# Patient Record
Sex: MALE | Race: WHITE
[De-identification: names, ages, dates, MRNs, and addresses within clinical notes are randomized per-mention and may not be internally consistent; named-entity substitution may affect disease eponyms.]

---

## 2020-08-19 ENCOUNTER — HOSPITAL ENCOUNTER (OUTPATIENT)
Dept: HOSPITAL 76 - EMS | Age: 65
Discharge: ANOTHER HOSPITAL | End: 2020-08-19
Attending: SURGERY
Payer: COMMERCIAL

## 2020-08-19 ENCOUNTER — HOSPITAL ENCOUNTER (EMERGENCY)
Dept: HOSPITAL 76 - ED | Age: 65
Discharge: HOME | End: 2020-08-19
Payer: COMMERCIAL

## 2020-08-19 VITALS — DIASTOLIC BLOOD PRESSURE: 103 MMHG | SYSTOLIC BLOOD PRESSURE: 139 MMHG

## 2020-08-19 DIAGNOSIS — R51: Primary | ICD-10-CM

## 2020-08-19 DIAGNOSIS — R20.0: ICD-10-CM

## 2020-08-19 DIAGNOSIS — J32.2: ICD-10-CM

## 2020-08-19 DIAGNOSIS — Z87.891: ICD-10-CM

## 2020-08-19 LAB
ALBUMIN DIAFP-MCNC: 4.4 G/DL (ref 3.2–5.5)
ALBUMIN/GLOB SERPL: 1.6 {RATIO} (ref 1–2.2)
ALP SERPL-CCNC: 59 IU/L (ref 42–121)
ALT SERPL W P-5'-P-CCNC: 15 IU/L (ref 10–60)
ANION GAP SERPL CALCULATED.4IONS-SCNC: 12 MMOL/L (ref 6–13)
AST SERPL W P-5'-P-CCNC: 16 IU/L (ref 10–42)
BASOPHILS NFR BLD AUTO: 0 10^3/UL (ref 0–0.1)
BASOPHILS NFR BLD AUTO: 0.5 %
BILIRUB BLD-MCNC: 0.7 MG/DL (ref 0.2–1)
BUN SERPL-MCNC: 13 MG/DL (ref 6–20)
CALCIUM UR-MCNC: 9.5 MG/DL (ref 8.5–10.3)
CHLORIDE SERPL-SCNC: 101 MMOL/L (ref 101–111)
CO2 SERPL-SCNC: 26 MMOL/L (ref 21–32)
CREAT SERPLBLD-SCNC: 1 MG/DL (ref 0.6–1.2)
EOSINOPHIL # BLD AUTO: 0.2 10^3/UL (ref 0–0.7)
EOSINOPHIL NFR BLD AUTO: 2.3 %
ERYTHROCYTE [DISTWIDTH] IN BLOOD BY AUTOMATED COUNT: 12.4 % (ref 12–15)
GLOBULIN SER-MCNC: 2.8 G/DL (ref 2.1–4.2)
GLUCOSE SERPL-MCNC: 98 MG/DL (ref 70–100)
HGB UR QL STRIP: 14.6 G/DL (ref 14–18)
LIPASE SERPL-CCNC: 36 U/L (ref 22–51)
LYMPHOCYTES # SPEC AUTO: 2.2 10^3/UL (ref 1.5–3.5)
LYMPHOCYTES NFR BLD AUTO: 27 %
MCH RBC QN AUTO: 31.5 PG (ref 27–31)
MCHC RBC AUTO-ENTMCNC: 34.4 G/DL (ref 32–36)
MCV RBC AUTO: 91.6 FL (ref 80–94)
MONOCYTES # BLD AUTO: 0.5 10^3/UL (ref 0–1)
MONOCYTES NFR BLD AUTO: 5.9 %
NEUTROPHILS # BLD AUTO: 5.2 10^3/UL (ref 1.5–6.6)
NEUTROPHILS # SNV AUTO: 8.2 X10^3/UL (ref 4.8–10.8)
NEUTROPHILS NFR BLD AUTO: 63.9 %
PDW BLD AUTO: 10.9 FL (ref 7.4–11.4)
PLATELET # BLD: 243 10^3/UL (ref 130–450)
PROT SPEC-MCNC: 7.2 G/DL (ref 6.7–8.2)
RBC MAR: 4.63 10^6/UL (ref 4.7–6.1)
SODIUM SERPLBLD-SCNC: 139 MMOL/L (ref 135–145)

## 2020-08-19 PROCEDURE — 36415 COLL VENOUS BLD VENIPUNCTURE: CPT

## 2020-08-19 PROCEDURE — 83690 ASSAY OF LIPASE: CPT

## 2020-08-19 PROCEDURE — 99284 EMERGENCY DEPT VISIT MOD MDM: CPT

## 2020-08-19 PROCEDURE — 70450 CT HEAD/BRAIN W/O DYE: CPT

## 2020-08-19 PROCEDURE — 80053 COMPREHEN METABOLIC PANEL: CPT

## 2020-08-19 PROCEDURE — 86140 C-REACTIVE PROTEIN: CPT

## 2020-08-19 PROCEDURE — 85025 COMPLETE CBC W/AUTO DIFF WBC: CPT

## 2020-08-19 PROCEDURE — 85651 RBC SED RATE NONAUTOMATED: CPT

## 2020-08-19 NOTE — CT REPORT
PROCEDURE:  HEAD WO

 

INDICATIONS:  daily headaches for 2 weeks

 

TECHNIQUE:  

Noncontrast 4.5 mm thick angled axial sections acquired from the foramen magnum to the vertex.  For r
adiation dose reduction, the following was used:  automated exposure control, adjustment of mA and/or
 kV according to patient size.

 

COMPARISON:  None.

 

FINDINGS:  

Image quality:  Excellent.  

 

CSF spaces:  Basal cisterns are patent.  No extra-axial fluid collections.  Ventricles are normal in 
size and shape.  

 

Brain:  No midline shift.  No intracranial masses or hemorrhage.  Gray-white matter interface is norm
al.  

 

Skull and face: No acute abnormality is seen in calvarium and bilateral facial bones. Prior fixation 
of right lateral orbital wall is seen.

 

Sinuses: Mild mucosal thickening in bilateral ethmoid sinuses are seen.

 

IMPRESSION:  

1. No CT evidence of acute intracranial pathology.

2. Postsurgical changes in lateral right orbital wall. Mild bilateral ethmoid sinusitis.

 

Reviewed by: Wilberto Rodriguez MD on 8/19/2020 6:31 PM PDT

Approved by: Wilberto Rodriguez MD on 8/19/2020 6:31 PM PDT

 

 

Station ID:  [id number]

## 2020-08-19 NOTE — ED PHYSICIAN DOCUMENTATION
History of Present Illness





- Stated complaint


Stated Complaint: HA





- Chief complaint


Chief Complaint: Neuro





- History obtained from


History obtained from: Patient





- Additonal information


Additional information: 


65-year-old male presents to the emergency department for evaluation of 2 weeks 

left temporal headache.  He reports that the headache has been constant in 

nature but over the last 2 days the skin in the area overlying the temple has 

become numb.  He has had no fevers no cough or dyspnea.  He denies slurred 

speech focal arm or leg weakness.  He has had no vision changes or diplopia.





Patient denies any history of high blood pressure or diabetes.  He takes no 

prescribed medications.  He works as a .  He denies tobacco use. 

He is a social drinker and occasionally eats cannabis edibles.








Review of Systems


Constitutional: denies: Fever, Chills


Eyes: denies: Loss of vision, Decreased vision, Photophobia, Irritation


Ears: denies: Loss of hearing, Ear pain


Nose: denies: Rhinorrhea / runny nose


Throat: denies: Dental pain / toothache


Cardiac: denies: Chest pain / pressure, Palpitations


Respiratory: denies: Dyspnea, Hemoptysis, Wheezing


GI: denies: Abdominal Pain, Abdominal Swelling, Nausea, Vomiting, Diarrhea


: denies: Dysuria, Frequency, Hesitancy


Skin: denies: Rash, Lesions, Abrasion (s)


Musculoskeletal: denies: Neck pain, Back pain


Neurologic: reports: Headache.  denies: Generalized weakness, Focal weakness, 

Numbness, Difficulty speaking, Near syncope, Syncope, Seizure, Confused, Altered

mental status, Head injury, LOC





PD PAST MEDICAL HISTORY





- Past Medical History


Past Medical History: Yes


Cardiovascular: None


Respiratory: Asthma


Neuro: Headaches


Endocrine/Autoimmune: None


GI: None


: None


HEENT: Chronic sinusitis


Psych: None


Musculoskeletal: Chronic back pain


Derm: None





- Past Surgical History


Past Surgical History: No





- Present Medications


Home Medications: 


                                Ambulatory Orders











 Medication  Instructions  Recorded  Confirmed


 


Ibuprofen [Motrin] 600 mg PO Q6H PRN #30 tab 08/19/20 














- Allergies


Allergies/Adverse Reactions: 


                                    Allergies











Allergy/AdvReac Type Severity Reaction Status Date / Time


 


No Known Drug Allergies Allergy   Verified 08/19/20 16:00














- Social History


Does the pt smoke?: Yes


Smoking Status: Former smoker


Does the pt drink ETOH?: Yes


ETOH Use: Liquor


Does the pt have substance abuse?: Yes


Substance Use and Type: Marijuana





- Immunizations


Immunizations are current?: Yes





- POLST


Patient has POLST: No





PD ED PE NORMAL





- General


General: Alert and oriented X 3, No acute distress, Well developed/nourished





- HEENT


HEENT: PERRL, EOMI





- Neck


Neck: Supple, no meningeal sign, No bony TTP, No adenopathy





- Cardiac


Cardiac: RRR, No murmur





- Respiratory


Respiratory: No respiratory distress





- Abdomen


Abdomen: Normal bowel sounds, Soft





- Back


Back: No CVA TTP, No spinal TTP





- Derm


Derm: Normal color





- Extremities


Extremities: No deformity, No tenderness to palpate





- Neuro


Neuro: Alert and oriented X 3, CNs 2-12 intact, No motor deficit, No sensory 

deficit, Normal speech, Other (Mild tenderness with palpation left temporal 

area.  No overlying erythema or ecchymosis.  No skin lesions)


Eye Opening: Spontaneous


Motor: Obeys Commands


Verbal: Oriented


GCS Score: 15





- Psych


Psych: Normal mood, Other (Patient reports some feelings of depression but no 

suicidal or homicidal ideation.  He has no auditory or visual hallucinations.  

He is alert calm oriented and very cooperative with this provider.  He makes 

very good eye contact.)





Results





- Vitals


Vitals: 


                               Vital Signs - 24 hr











  08/19/20 08/19/20





  16:01 16:12


 


Temperature 37.2 C 


 


Heart Rate 84 80


 


Respiratory 14 12





Rate  


 


Blood Pressure 107/86 H 146/98 H


 


O2 Saturation 100 100








                                     Oxygen











O2 Source                      Room air

















- Labs


Labs: 


                                Laboratory Tests











  08/19/20 08/19/20 08/19/20





  17:23 17:23 17:23


 


WBC  8.2  


 


RBC  4.63 L  


 


Hgb  14.6  


 


Hct  42.4  


 


MCV  91.6  


 


MCH  31.5 H  


 


MCHC  34.4  


 


RDW  12.4  


 


Plt Count  243  


 


MPV  10.9  


 


Neut # (Auto)  5.2  


 


Lymph # (Auto)  2.2  


 


Mono # (Auto)  0.5  


 


Eos # (Auto)  0.2  


 


Baso # (Auto)  0.0  


 


Absolute Nucleated RBC  0.00  


 


Nucleated RBC %  0.0  


 


ESR   2 


 


Sodium    139


 


Potassium    3.7


 


Chloride    101


 


Carbon Dioxide    26


 


Anion Gap    12.0


 


BUN    13


 


Creatinine    1.0


 


Estimated GFR (MDRD)    75 L


 


Glucose    98


 


Calcium    9.5


 


Total Bilirubin    0.7


 


AST    16


 


ALT    15


 


Alkaline Phosphatase    59


 


C-Reactive Protein   


 


Total Protein    7.2


 


Albumin    4.4


 


Globulin    2.8


 


Albumin/Globulin Ratio    1.6


 


Lipase    36














  08/19/20





  17:23


 


WBC 


 


RBC 


 


Hgb 


 


Hct 


 


MCV 


 


MCH 


 


MCHC 


 


RDW 


 


Plt Count 


 


MPV 


 


Neut # (Auto) 


 


Lymph # (Auto) 


 


Mono # (Auto) 


 


Eos # (Auto) 


 


Baso # (Auto) 


 


Absolute Nucleated RBC 


 


Nucleated RBC % 


 


ESR 


 


Sodium 


 


Potassium 


 


Chloride 


 


Carbon Dioxide 


 


Anion Gap 


 


BUN 


 


Creatinine 


 


Estimated GFR (MDRD) 


 


Glucose 


 


Calcium 


 


Total Bilirubin 


 


AST 


 


ALT 


 


Alkaline Phosphatase 


 


C-Reactive Protein  < 1.0


 


Total Protein 


 


Albumin 


 


Globulin 


 


Albumin/Globulin Ratio 


 


Lipase 














- Rads (name of study)


  ** CT head


Radiology: Final report received (No CT evidence of acute intracranial 

pathology.  Postsurgical changes in the lateral right orbital wall.  Mild 

bilateral ethmoid sinusitis.)





PD MEDICAL DECISION MAKING





- ED course


Complexity details: reviewed results, re-evaluated patient, considered 

differential, d/w patient


ED course: 





65-year-old male presents to the emergency department for evaluation of 2 weeks 

of left-sided temporal headache.  He has had no vision changes fevers any focal 

neuro deficits.  Given his age and the location of the pain there was concerned 

that the differential may include a temporal arteritis.  His sedimentation rate 

and CRP were normal without any.  He had no leukocytosis and his screening labs 

were otherwise.  The likelihood of giant cell arteritis is less likely giving 

the normal CRP and sedimentation rate. 





we did proceed with a head CT that showed no acute intracranial abnormality 

specifically no tumor mass bleed or shift.  I discussed the ED findings with the

 patient at this time he denies that he has a headache.  He thinks that it may 

be stemming from a stress at work.  I have advised close follow-up with his 

primary care doctor for further evaluation





Departure





- Departure


Disposition: 01 Home, Self Care


Clinical Impression: 


Headache


Qualifiers:


 Headache type: unspecified Headache chronicity pattern: acute headache 

Intractability: not intractable Qualified Code(s): R51 - Headache





Condition: Stable


Record reviewed to determine appropriate education?: Yes


Instructions:  ED Cephalgia Unspecified


Prescriptions: 


Ibuprofen [Motrin] 600 mg PO Q6H PRN #30 tab


 PRN Reason: Pain


Comments: 


Teddy the CT of your head did not show any worrisome findings.  Because of the 

location of your headache we did check to labs called a sedimentation rate and a

 CRP.  They were both normal.  I would like you to discuss this emergency 

department visit with your primary care doctors at the [facility].  If you 

find that your headache returns and is suddenly severe, you have uncontrolled 

vomiting any vision changes or fevers then please return to the emergency 

department for a second look

## 2023-04-19 ENCOUNTER — HOSPITAL ENCOUNTER (OUTPATIENT)
Dept: HOSPITAL 76 - LAB | Age: 68
Discharge: HOME | End: 2023-04-19
Attending: EMERGENCY MEDICINE
Payer: MEDICARE

## 2023-04-19 DIAGNOSIS — N41.0: Primary | ICD-10-CM

## 2023-04-19 DIAGNOSIS — R31.9: ICD-10-CM

## 2023-04-19 LAB
CLARITY UR REFRACT.AUTO: (no result)
GLUCOSE UR QL STRIP.AUTO: NEGATIVE MG/DL
KETONES UR QL STRIP.AUTO: NEGATIVE MG/DL
NITRITE UR QL STRIP.AUTO: NEGATIVE
PH UR STRIP.AUTO: 5.5 PH (ref 5–7.5)
PROT UR STRIP.AUTO-MCNC: 100 MG/DL
RBC # UR STRIP.AUTO: (no result) /UL
RBC # URNS HPF: (no result) /HPF (ref 0–5)
SP GR UR STRIP.AUTO: 1.01 (ref 1–1.03)
SQUAMOUS URNS QL MICRO: (no result)
UROBILINOGEN UR QL STRIP.AUTO: (no result) E.U./DL
UROBILINOGEN UR STRIP.AUTO-MCNC: NEGATIVE MG/DL
WBC # UR MANUAL: >25 /HPF (ref 0–3)

## 2023-04-19 PROCEDURE — 87086 URINE CULTURE/COLONY COUNT: CPT

## 2023-04-19 PROCEDURE — 81001 URINALYSIS AUTO W/SCOPE: CPT

## 2023-04-19 PROCEDURE — 87181 SC STD AGAR DILUTION PER AGT: CPT

## 2023-05-02 ENCOUNTER — HOSPITAL ENCOUNTER (OUTPATIENT)
Dept: HOSPITAL 76 - LAB.S | Age: 68
Discharge: HOME | End: 2023-05-02
Attending: EMERGENCY MEDICINE
Payer: MEDICARE

## 2023-05-02 DIAGNOSIS — N41.0: Primary | ICD-10-CM

## 2023-05-02 DIAGNOSIS — R31.9: ICD-10-CM

## 2023-05-02 LAB
ANION GAP SERPL CALCULATED.4IONS-SCNC: 8 MMOL/L (ref 6–13)
BASOPHILS NFR BLD AUTO: 0 10^3/UL (ref 0–0.1)
BASOPHILS NFR BLD AUTO: 0.4 %
BUN SERPL-MCNC: 20 MG/DL (ref 6–20)
CALCIUM UR-MCNC: 9.1 MG/DL (ref 8.5–10.3)
CHLORIDE SERPL-SCNC: 104 MMOL/L (ref 101–111)
CO2 SERPL-SCNC: 25 MMOL/L (ref 21–32)
CREAT SERPLBLD-SCNC: 1.1 MG/DL (ref 0.6–1.2)
EOSINOPHIL # BLD AUTO: 0.1 10^3/UL (ref 0–0.7)
EOSINOPHIL NFR BLD AUTO: 1.1 %
ERYTHROCYTE [DISTWIDTH] IN BLOOD BY AUTOMATED COUNT: 12.7 % (ref 12–15)
GFRSERPLBLD MDRD-ARVRAT: 67 ML/MIN/{1.73_M2} (ref 89–?)
GLUCOSE SERPL-MCNC: 101 MG/DL (ref 70–100)
HCT VFR BLD AUTO: 40.2 % (ref 42–52)
HGB UR QL STRIP: 12.7 G/DL (ref 14–18)
LYMPHOCYTES # SPEC AUTO: 2.2 10^3/UL (ref 1.5–3.5)
LYMPHOCYTES NFR BLD AUTO: 22.8 %
MCH RBC QN AUTO: 29.7 PG (ref 27–31)
MCHC RBC AUTO-ENTMCNC: 31.6 G/DL (ref 32–36)
MCV RBC AUTO: 94.1 FL (ref 80–94)
MONOCYTES # BLD AUTO: 0.5 10^3/UL (ref 0–1)
MONOCYTES NFR BLD AUTO: 5.5 %
NEUTROPHILS # BLD AUTO: 6.6 10^3/UL (ref 1.5–6.6)
NEUTROPHILS # SNV AUTO: 9.4 X10^3/UL (ref 4.8–10.8)
NEUTROPHILS NFR BLD AUTO: 69.9 %
NRBC # BLD AUTO: 0 /100WBC
NRBC # BLD AUTO: 0 X10^3/UL
PDW BLD AUTO: 10.1 FL (ref 7.4–11.4)
PLATELET # BLD: 474 10^3/UL (ref 130–450)
POTASSIUM SERPL-SCNC: 4.3 MMOL/L (ref 3.5–5)
RBC MAR: 4.27 10^6/UL (ref 4.7–6.1)
SODIUM SERPLBLD-SCNC: 137 MMOL/L (ref 135–145)

## 2023-05-02 PROCEDURE — 85025 COMPLETE CBC W/AUTO DIFF WBC: CPT

## 2023-05-02 PROCEDURE — 80048 BASIC METABOLIC PNL TOTAL CA: CPT

## 2023-05-02 PROCEDURE — 84153 ASSAY OF PSA TOTAL: CPT

## 2023-05-02 PROCEDURE — 36415 COLL VENOUS BLD VENIPUNCTURE: CPT

## 2023-05-16 ENCOUNTER — HOSPITAL ENCOUNTER (OUTPATIENT)
Dept: HOSPITAL 76 - LAB.S | Age: 68
Discharge: HOME | End: 2023-05-16
Attending: EMERGENCY MEDICINE
Payer: MEDICARE

## 2023-05-16 DIAGNOSIS — N41.0: Primary | ICD-10-CM

## 2023-05-16 DIAGNOSIS — R31.9: ICD-10-CM

## 2023-05-16 LAB
CLARITY UR REFRACT.AUTO: CLEAR
GLUCOSE UR QL STRIP.AUTO: NEGATIVE MG/DL
KETONES UR QL STRIP.AUTO: NEGATIVE MG/DL
NITRITE UR QL STRIP.AUTO: NEGATIVE
PH UR STRIP.AUTO: 6 PH (ref 5–7.5)
PROT UR STRIP.AUTO-MCNC: NEGATIVE MG/DL
RBC # UR STRIP.AUTO: NEGATIVE /UL
SP GR UR STRIP.AUTO: 1.01 (ref 1–1.03)
SQUAMOUS URNS QL MICRO: (no result)
UROBILINOGEN UR QL STRIP.AUTO: (no result) E.U./DL
UROBILINOGEN UR STRIP.AUTO-MCNC: NEGATIVE MG/DL
WBC # UR MANUAL: (no result) /HPF (ref 0–3)

## 2023-05-16 PROCEDURE — 87086 URINE CULTURE/COLONY COUNT: CPT

## 2023-05-16 PROCEDURE — 84153 ASSAY OF PSA TOTAL: CPT

## 2023-05-16 PROCEDURE — 81001 URINALYSIS AUTO W/SCOPE: CPT

## 2023-05-16 PROCEDURE — 36415 COLL VENOUS BLD VENIPUNCTURE: CPT

## 2023-06-09 ENCOUNTER — HOSPITAL ENCOUNTER (OUTPATIENT)
Dept: HOSPITAL 76 - LAB.S | Age: 68
Discharge: HOME | End: 2023-06-09
Attending: EMERGENCY MEDICINE
Payer: MEDICARE

## 2023-06-09 DIAGNOSIS — R31.9: Primary | ICD-10-CM

## 2023-06-09 LAB
CLARITY UR REFRACT.AUTO: CLEAR
GLUCOSE UR QL STRIP.AUTO: NEGATIVE MG/DL
KETONES UR QL STRIP.AUTO: NEGATIVE MG/DL
NITRITE UR QL STRIP.AUTO: NEGATIVE
PH UR STRIP.AUTO: 6.5 PH (ref 5–7.5)
PROT UR STRIP.AUTO-MCNC: NEGATIVE MG/DL
RBC # UR STRIP.AUTO: NEGATIVE /UL
RBC # URNS HPF: (no result) /HPF (ref 0–5)
SP GR UR STRIP.AUTO: 1.01 (ref 1–1.03)
SQUAMOUS URNS QL MICRO: (no result)
UROBILINOGEN UR QL STRIP.AUTO: (no result) E.U./DL
UROBILINOGEN UR STRIP.AUTO-MCNC: NEGATIVE MG/DL
WBC # UR MANUAL: (no result) /HPF (ref 0–3)

## 2023-06-09 PROCEDURE — 87086 URINE CULTURE/COLONY COUNT: CPT

## 2023-06-09 PROCEDURE — 81001 URINALYSIS AUTO W/SCOPE: CPT
